# Patient Record
Sex: MALE | Race: WHITE | Employment: OTHER | URBAN - METROPOLITAN AREA
[De-identification: names, ages, dates, MRNs, and addresses within clinical notes are randomized per-mention and may not be internally consistent; named-entity substitution may affect disease eponyms.]

---

## 2018-12-30 ENCOUNTER — HOSPITAL ENCOUNTER (EMERGENCY)
Facility: CLINIC | Age: 33
Discharge: HOME | End: 2018-12-30
Attending: EMERGENCY MEDICINE | Admitting: EMERGENCY MEDICINE

## 2018-12-30 VITALS
RESPIRATION RATE: 24 BRPM | WEIGHT: 250 LBS | HEART RATE: 79 BPM | BODY MASS INDEX: 32.08 KG/M2 | DIASTOLIC BLOOD PRESSURE: 87 MMHG | SYSTOLIC BLOOD PRESSURE: 135 MMHG | TEMPERATURE: 98.7 F | OXYGEN SATURATION: 99 % | HEIGHT: 74 IN

## 2018-12-30 DIAGNOSIS — J40 BRONCHITIS: ICD-10-CM

## 2018-12-30 DIAGNOSIS — F17.200 TOBACCO DEPENDENCE SYNDROME: ICD-10-CM

## 2018-12-30 PROCEDURE — 99284 EMERGENCY DEPT VISIT MOD MDM: CPT | Mod: 25

## 2018-12-30 PROCEDURE — 94640 AIRWAY INHALATION TREATMENT: CPT

## 2018-12-30 PROCEDURE — 25000125 ZZHC RX 250: Performed by: EMERGENCY MEDICINE

## 2018-12-30 RX ORDER — PREDNISONE 20 MG/1
40 TABLET ORAL ONCE
Status: COMPLETED | OUTPATIENT
Start: 2018-12-30 | End: 2018-12-30

## 2018-12-30 RX ORDER — IPRATROPIUM BROMIDE AND ALBUTEROL SULFATE 2.5; .5 MG/3ML; MG/3ML
3 SOLUTION RESPIRATORY (INHALATION) ONCE
Status: COMPLETED | OUTPATIENT
Start: 2018-12-30 | End: 2018-12-30

## 2018-12-30 RX ORDER — PREDNISONE 20 MG/1
TABLET ORAL
Qty: 10 TABLET | Refills: 0 | Status: SHIPPED | OUTPATIENT
Start: 2018-12-30 | End: 2019-01-06

## 2018-12-30 RX ORDER — ALBUTEROL SULFATE 0.83 MG/ML
2.5 SOLUTION RESPIRATORY (INHALATION) ONCE
Status: COMPLETED | OUTPATIENT
Start: 2018-12-30 | End: 2018-12-30

## 2018-12-30 RX ORDER — ALBUTEROL SULFATE 90 UG/1
2 AEROSOL, METERED RESPIRATORY (INHALATION) EVERY 6 HOURS PRN
Qty: 1 INHALER | Refills: 0 | Status: SHIPPED | OUTPATIENT
Start: 2018-12-30 | End: 2019-01-29

## 2018-12-30 RX ADMIN — IPRATROPIUM BROMIDE AND ALBUTEROL SULFATE 3 ML: .5; 3 SOLUTION RESPIRATORY (INHALATION) at 15:21

## 2018-12-30 RX ADMIN — ALBUTEROL SULFATE 2.5 MG: 2.5 SOLUTION RESPIRATORY (INHALATION) at 16:05

## 2018-12-30 RX ADMIN — PREDNISONE 40 MG: 20 TABLET ORAL at 15:21

## 2018-12-30 ASSESSMENT — ENCOUNTER SYMPTOMS
SHORTNESS OF BREATH: 1
LIGHT-HEADEDNESS: 1
VOMITING: 0
COUGH: 1

## 2018-12-30 ASSESSMENT — MIFFLIN-ST. JEOR: SCORE: 2148.74

## 2018-12-30 NOTE — ED AVS SNAPSHOT
[facility] Emergency Department  [address]  [facility] [address]  Phone:  [phone]  Fax:  [phone]                                    Austin Hull   MRN: [id number]    Department:  [facility] Emergency Department   Date of Visit:  12/30/2018           After Visit Summary Signature Page    I have received my discharge instructions, and my questions have been answered. I have discussed any challenges I see with this plan with the nurse or doctor.    ..........................................................................................................................................  Patient/Patient Representative Signature      ..........................................................................................................................................  Patient Representative Print Name and Relationship to Patient    ..................................................               ................................................  Date                                   Time    ..........................................................................................................................................  Reviewed by Signature/Title    ...................................................              ..............................................  Date                                               Time          22EPIC Rev 08/18

## 2018-12-30 NOTE — ED TRIAGE NOTES
Pt has cough and shortness of breath for past 4 days.  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  He has pain in right lower back area.

## 2018-12-30 NOTE — ED PROVIDER NOTES
"  History     Chief Complaint:  Shortness of Breath    The history is provided by the patient.      Austin Hull is a 33 year old male who presents to the emergency department today for evaluation of gradually worsening shortness of breath. Patient states he has been experiencing gradually worsening shortness of breath over the last two months after juanito a cold. He highlight dizziness secondary to coughing spells and reports waking up hourly secondary to coughing spells as his shortness of breath is usually worse at night when it is colder. He denies any emesis, personal history of asthma, previous surgical history, or history of hypertension, hyperlipidemia, or diabetes. He highlights his last use of prednisone was a few years ago and state she has a nebulizer at home of which he has been using at home during exacerbation of his shortness of breath. He notes a family history of asthma.    Allergies:  Penicillins    Medications:    No current medications     Past Medical History:    Bronchitis     Past Surgical History:    History reviewed. No pertinent surgical history.     Family History:    History reviewed. No pertinent family history.     Social History:  Smoking Status: Current Every Day Smoker (1.00 PPD)  Alcohol Use: Positive    Review of Systems   Respiratory: Positive for cough and shortness of breath.    Gastrointestinal: Negative for vomiting.   Neurological: Positive for light-headedness.   All other systems reviewed and are negative.    Physical Exam     Patient Vitals for the past 24 hrs:   BP Temp Temp src Pulse Heart Rate Resp SpO2 Height Weight   12/30/18 1645 -- -- -- -- -- -- 99 % -- --   12/30/18 1640 135/87 -- -- 79 -- -- 94 % -- --   12/30/18 1415 -- -- -- -- -- -- 95 % -- --   12/30/18 1410 -- -- -- 85 -- -- 96 % -- --   12/30/18 1408 (!) 153/93 98.7  F (37.1  C) Oral -- 87 24 95 % 1.88 m (6' 2\") 113.4 kg (250 lb)   12/30/18 1405 (!) 153/93 -- -- -- -- -- -- -- --     Physical " Exam  Constitutional: Vital signs reviewed as above.  Head: No external signs of trauma noted.  Eyes: Pupils are equal, round, and reactive to light.   Neck: No JVD noted  Cardiovascular: Normal rate, regular rhythm and normal heart sounds.  No murmur heard. Equal B/L peripheral pulses.  Pulmonary/Chest: No respiratory distress. Patient has wheezes in all fields. Patient has no rales.   Gastrointestinal: Soft. There is no tenderness.   Musculoskeletal/Extremities: No edema noted. Normal tone.  Neurological: Patient is alert and oriented to person, place, and time.   Skin: Skin is warm and dry. There is no diaphoresis noted.   Psychiatric: The patient appears calm.    Emergency Department Course     Interventions:  1521: Duoneb 3 mL nebulization   1521: Prednisone 40 mg PO  1605: Albuterol 2.5 mg nebulization    Emergency Department Course:    1431 Nursing notes and vitals reviewed.    1454 I performed an exam of the patient as documented above.     1535 Recheck and update.    1555 Recheck and update.    1634 Recheck and update.  I personally answered all related questions prior to discharge.    Impression & Plan      Medical Decision Making:  Austin Hull is a 33 year old male who presents to the emergency department today for evaluation of dyspnea.  Please see the HPI and exam for specifics.  Patient improved in the ED after nebulization treatments.  He did have notable wheezing that improved after these treatments.  The patient is a 1 pack/day smoker and is aware that he does need to quit.  We discussed reasonable ways to reduce cigarette smoking and the patient seemed amenable to this.  I will send him home with steroids and an inhaler as he has been using his girlfriend's inhaler.  The patient does note that he does have an albuterol nebulizer with medication at home and I have suggested that he increase his frequency of usage with that and decrease cigarette smoking.  Anticipatory guidance given prior to  discharge.    Diagnosis:    ICD-10-CM   1. Bronchitis J40   2. Tobacco dependence syndrome F17.200     Disposition:   The patient is discharged to home.    Discharge Medications:     Review of your medicines      UNREVIEWED medicines. Ask your doctor about these medicines      Dose / Directions   benzonatate 200 MG capsule  Commonly known as:  TESSALON      Dose:  200 mg  Take 1 capsule (200 mg) by mouth 3 times daily as needed for cough  Quantity:  21 capsule  Refills:  0        START taking      Dose / Directions   albuterol 108 (90 Base) MCG/ACT inhaler  Commonly known as:  PROAIR HFA/PROVENTIL HFA/VENTOLIN HFA      Dose:  2 puff  Inhale 2 puffs into the lungs every 6 hours as needed for shortness of breath / dyspnea or wheezing  Quantity:  1 Inhaler  Refills:  0        CONTINUE these medicines which have NOT CHANGED      Dose / Directions   predniSONE 20 MG tablet  Commonly known as:  DELTASONE      Take two tablets (= 40mg) each day for 5 (five) days.  Quantity:  10 tablet  Refills:  0           Where to get your medicines      Some of these will need a paper prescription and others can be bought over the counter. Ask your nurse if you have questions.    Bring a paper prescription for each of these medications    albuterol 108 (90 Base) MCG/ACT inhaler    predniSONE 20 MG tablet       Scribe Disclosure:  Arcenio REDDING, am serving as a scribe at 2:54 PM on 12/30/2018 to document services personally performed by Bang Mckinley DO based on my observations and the provider's statements to me.    [facility] EMERGENCY DEPARTMENT       Bang Mckinley DO  12/30/18 1936

## 2021-01-30 ENCOUNTER — HOSPITAL ENCOUNTER (EMERGENCY)
Facility: CLINIC | Age: 36
Discharge: HOME | End: 2021-01-30
Attending: EMERGENCY MEDICINE | Admitting: EMERGENCY MEDICINE

## 2021-01-30 ENCOUNTER — APPOINTMENT (OUTPATIENT)
Dept: GENERAL RADIOLOGY | Facility: CLINIC | Age: 36
End: 2021-01-30
Attending: EMERGENCY MEDICINE

## 2021-01-30 VITALS
TEMPERATURE: 98.2 F | RESPIRATION RATE: 16 BRPM | SYSTOLIC BLOOD PRESSURE: 140 MMHG | DIASTOLIC BLOOD PRESSURE: 102 MMHG | OXYGEN SATURATION: 100 % | HEART RATE: 82 BPM

## 2021-01-30 DIAGNOSIS — R51.9 FACIAL PAIN: ICD-10-CM

## 2021-01-30 PROCEDURE — 99283 EMERGENCY DEPT VISIT LOW MDM: CPT

## 2021-01-30 PROCEDURE — 70220 X-RAY EXAM OF SINUSES: CPT

## 2021-01-30 PROCEDURE — 250N000013 HC RX MED GY IP 250 OP 250 PS 637: Performed by: EMERGENCY MEDICINE

## 2021-01-30 RX ORDER — ACETAMINOPHEN 500 MG
1000 TABLET ORAL ONCE
Status: COMPLETED | OUTPATIENT
Start: 2021-01-30 | End: 2021-01-30

## 2021-01-30 RX ORDER — CLINDAMYCIN HCL 300 MG
300 CAPSULE ORAL 4 TIMES DAILY
Qty: 28 CAPSULE | Refills: 0 | Status: SHIPPED | OUTPATIENT
Start: 2021-01-30 | End: 2021-02-06

## 2021-01-30 RX ORDER — OXYCODONE HYDROCHLORIDE 5 MG/1
5 TABLET ORAL ONCE
Status: COMPLETED | OUTPATIENT
Start: 2021-01-30 | End: 2021-01-30

## 2021-01-30 RX ORDER — IBUPROFEN 200 MG
600 TABLET ORAL EVERY 8 HOURS PRN
Qty: 1 TABLET | Refills: 0 | Status: SHIPPED | OUTPATIENT
Start: 2021-01-30

## 2021-01-30 RX ORDER — ACETAMINOPHEN 500 MG
500-1000 TABLET ORAL EVERY 8 HOURS PRN
Qty: 1 TABLET | Refills: 0 | Status: SHIPPED | OUTPATIENT
Start: 2021-01-30 | End: 2021-02-09

## 2021-01-30 RX ADMIN — ACETAMINOPHEN 1000 MG: 500 TABLET, FILM COATED ORAL at 17:23

## 2021-01-30 RX ADMIN — OXYCODONE HYDROCHLORIDE 5 MG: 5 TABLET ORAL at 17:23

## 2021-01-30 ASSESSMENT — ENCOUNTER SYMPTOMS
COLOR CHANGE: 1
SHORTNESS OF BREATH: 0
FEVER: 0
FACIAL SWELLING: 1
VOMITING: 0
RHINORRHEA: 1
COUGH: 0

## 2021-01-30 NOTE — ED PROVIDER NOTES
History   Chief Complaint:  Facial Pain       The history is provided by the patient.      Austin Hull is a 35 year old male with history of substance abuse who presents for evaluation of left-sided facial pain, swelling, and redness for the past two days. The pain began two days ago and improved yesterday but became very painful again today. He reports relief when pushing on his sinuses. Pain is not worse with cold air. He notes rhinorrhea and congestion for four days prior to onset of facial pain. He has a history of dental abscess but notes this is more painful. He denies fever, cough, shortness of breath, vomiting, rash, loss of taste/smell, or other symptoms. He took 600 mg ibuprofen one hour ago.     Review of Systems   Constitutional: Negative for fever.   HENT: Positive for congestion, dental problem, facial swelling and rhinorrhea.    Respiratory: Negative for cough and shortness of breath.    Gastrointestinal: Negative for vomiting.   Skin: Positive for color change. Negative for rash.   All other systems reviewed and are negative.      Allergies:  Penicillins    Medications:  The patient is currently on no regular medications.    Past Medical History:    Asthma  Bronchitis  Methamphetamine abuse    Social History:  Presents with his partner  PCP: No primary clinic  Tobacco use: Positive  Drug use: History of substance abuse    Physical Exam     Patient Vitals for the past 24 hrs:   BP Temp Temp src Pulse Resp SpO2   01/30/21 1659 (!) 140/102 98.2  F (36.8  C) Oral 82 16 100 %       Physical Exam    HENT: Possible subtle swelling to left malar region. No appreciable overlying erythema.    Mouth/Throat: Oropharynx is without swelling or erythema. Oral mucosa moist. Poor dentition overall. Few teeth remain to upper left side. No tenderness to percussion of remaining teeth. No adjacent swelling/fluctuance.    Eyes: Conjunctivae are normal. No scleral icterus.  Neck: Neck supple. No cervical  adenopathy  Cardiovascular: Normal rate, regular rhythm and intact distal pulses.    Pulmonary/Chest: Effort normal and breath sounds normal.   Neurological:Alert. Coordination normal. No facial asymmetry with movement.   Skin: Skin is warm and dry.   Psychiatric: Normal mood and affect.      Emergency Department Course     Imaging:  XR Sinus Complete G/E 3 views:  Paranasal sinuses are clear. No fracture. No fluid in the sinuses, as per radiology.      Emergency Department Course:    Reviewed:  I reviewed nursing notes, vitals, past medical history and care everywhere    Assessments:  1705 I obtained history and examined the patient as noted above.   1808 I rechecked the patient and explained findings.     Interventions:  1723 Oxycodone, 5 mg, PO  1723 Acetaminophen, 1,000 mg, PO    Disposition:  The patient was discharged to home.       Impression & Plan       Medical Decision Making:  Austin Hull is a 35 year old male with dental pain, left sided facial swelling. The differential diagnosis included but was not limited to odontogenic infection, sinusitis, cellulitis. ED  Evaluation as noted above.  There are no findings concerning for sinusitis on x-ray and his sinus symptoms were subtle.  For this reason I believe the pain is likely odontogenic and will initiate antibiotics for dental source.  Recommended close follow-up with a dentist as soon as possible and returning to the emergency department with new or worsening symptoms.        Diagnosis:    ICD-10-CM    1. Facial pain  R51.9     likely odontogenic       Discharge Medications:  New Prescriptions    ACETAMINOPHEN (TYLENOL) 500 MG TABLET    Take 1-2 tablets (500-1,000 mg) by mouth every 8 hours as needed for mild pain (DO NOT FILL! For dosing only.) DO NOT FILL!   For Dosing Only    CLINDAMYCIN (CLEOCIN) 300 MG CAPSULE    Take 1 capsule (300 mg) by mouth 4 times daily for 7 days    IBUPROFEN (ADVIL/MOTRIN) 200 MG TABLET    Take 3 tablets (600 mg) by  mouth every 8 hours as needed for mild pain       Scribe Disclosure:  I, Stella Deloresparkstella, am serving as a scribe at 5:00 PM on 1/30/2021 to document services personally performed by Rosanna Diez MD based on my observations and the provider's statements to me.              Rosanna Diez MD  01/30/21 2012

## 2021-01-30 NOTE — ED TRIAGE NOTES
Pt here for R sided face pain, states that it feels like sinus pain. Pain is better when he presses on cheek. Pain started last week, but is worsening. Motrin taken this morning with minor relief. A+Ox4, no acute signs of distress.

## 2021-01-31 NOTE — DISCHARGE INSTRUCTIONS
Discharge Instructions  Dental Pain    You have been seen today for a toothache. Your pain may be caused by an exposed nerve, an infection (pulpitis), a root abscess, or other problems. You will need to see a dentist for a solution to your tooth problem. Emergency Department care is only to help control your problem until you can see a dentist.  Today, we did not find any sign that your toothache was caused by a serious condition, but sometimes symptoms develop over time and cannot be found during an emergency visit, so it is very important that you follow up with your dentist.      Return to the Emergency Department if:  You develop a fever over 101 degrees Fahrenheit  You can t open your mouth normally, can t move your tongue well, or can t swallow  You have new or increased swelling of your face or neck.  You develop drainage of pus or foul smelling material from around your tooth.  What can I do to help myself?  Take any antibiotic the doctor may have prescribed for you today.  Avoid very hot or very cold foods as both can cause pain.  Make an appointment to see a dentist as soon as possible. If you wish, we can provide you with a list of low-cost dental clinics.       Remember that you can always come back to the Emergency Department if you are not able to see your regular doctor in the amount of time listed above, if you get any new symptoms, or if there is anything that worries you.        Dental Resources  Name/Address/Phone Eligibility Hours Fee   [facility]  [address], [address]  [place] [address]  [phone] Anyone Call for appointment [facility]  Medical [facility]  Private Insurance   [facility]  Hygiene Clinic  [facility] [place], [place] [id number] [phone] Anyone Call for appointment    [facility] refers to low-cost dental clinics for non-preventive care    Czech Interpreters available Prices start at   Adults        Cleaning $36-$160        X-Ray  $20-40  Children        Cleaning $15        X-ray $10-20        Fluoride $10  Accepts cash, check or credit;  Does not take insurance or MA.   [facility]  [address] [place], [place]  [id number] [phone] Anyone Afternoons and evenings    September-May    Answers phones after 10 AM $30.00 per visit   ($15.00 per visit if 62 or older)   Preventive care.  Restoration care; sliding fee; MA   [facility]  [address] [place], [place] [id number] [phone] Children birth to age 18 and pregnant women    [facility] Residents without insurance will be asked to apply for Assured Care. M TH F 8:30 am - 5 pm  T W 8:30 am - 7 pm    30 locations metro wide    Call for appointment and to confirm hours. Sliding Fee  [facility]  Medical Assistance  Assured Access  Private Insurance    8 Languages Spoken   [facility] [address] [address]  [phone] Anyone Call for appointment Sliding Fee  Accept insurance, [place],   [place] and self-pay.  Call if no insurance.    All services provided.  Staff fluent in Hmong, Laotian, Jori, Hungarian, Slovak, Kazakh, and Farsi.   [facility]  [address] [place] [address]  [phone] Children  Adults in a walk in basis Mon - Fri. 8 - 5 pm       (closed 1-2 pm)  General Dentists & Hygienists  Private Dentists  Dentures Fees based on family size and income ranging from 40% - 100% payment by patient.   [facility]  [address] [place], [place]  [id number] [phone] Anyone Mon - Fri 7:30 am - 5:00 pm  By Appt.    Tues & Wed @ 3:00 call for urgent care Appt for next day service Sliding fee:  MA; Insurance   [facility]  [address] [place], [place]  [id number] [phone] Anyone Call for an appointment.  Days open vary every semester. Adult cleaning $25  Child cleaning $15  X-Rays $10-$15  Whitening  services available  $75, includes cleaning  Seniors 50% off   [facility]  [address] [place]  [address]  [phone] Anyone M-F 8 am - 5 pm Most insurances accepted.  MA and Sliding Scale.   [facility]  [facility] [place], [place]  [id number] [phone] Anyone without insurance Call to make appt   M-F 9:00 am - 5 pm   (Closed noon hour 12-1)    6 pm- 8 pm Evening hours also available for care Sliding fee based on income and size of household.   [facility] Clinic  [address] [place]  [address]  [phone] press option 1    For the [facility] press option 4 Anyone              Anyone Monday  4 - 6:30 pm  Tuesday 12:30 - 3 & 4-6:30  Thursday 8:30 - 11 am & 12-2:30 pm  September through May only    All year around on Thursdays from 5-9 pm (only time a dentist is in.) Cleanings & X-Rays Only  Cleanings:  Adults $30                   Kids $20  X-Rays:  Adults $34                Kids $10    MA and Sliding fee   [address] [id number] [phone] Anyone    (Crowdwaveong interpreters available) M-F 8:00 am - 5:00 pm       By appointment only  (same day appointments available) Sliding fee ($40+ may be due at appointment, remainder billed); MA; Insurance   [facility]  [address] [place], [place] [phone]   Anyone    (Hmong interpreters available) M-Th 8:00 am - 8:00 pm  F 8:00 am - 5:00 pm    By appointment only  (same day appointments available) Sliding fee ($40+ may be due at appointment, remainder billed); MA; Insurance   [facility]  [address] [place], [place]  [address] [phone] Anyone    Must live within [facility] to qualify for sliding fee services Mon, Tues, Thurs, Fri  8:30 am - 5:00 pm  Wed. 8:30 am - 7:00 pm  All other services by appt. only Sliding  Fee; MA   Offer payment plans    Have financial workers that will assist with [place] and will use sliding fee for those who do not qualify.      [facility]  [address] [place] [address]  [phone] Anyone without insurance     Hours and day of week vary  (Call ahead for hours)    Walk-in only Free Services    Cleanings; Fillings; Extractions   [facility]  [address] [place] [address]  [address]  [phone] Anyone Call for an appointment Accept patients with [place] and Medical Assistance.  10% discount if bill is paid in full on day of service.  No sliding fee scale.     [facility]  [address] [place] [address]  [phone] Anyone M-F  8 am-5 pm  Call for appt.    Walk-in hours 8 am - 11am and 1 pm - 5 pm, however take scheduled appointments first    No emergency services or oral surgeries Sliding Fee available with an [place] or [place] denial letter and proof of income.    Accepts Assured Access card and MA coverage.     Name/Address/Phone Eligibility Hours Fee   [facility]  [address] [place]  [address]  [phone] Anyone with emergencies only M & W clinic begins at 6 pm   Call ahead    Alternate Fridays for children by Appt only Free   [facility]  [address] [place], [place] [address] [phone]    Emergencies (adults only):  [phone] Anyone Free walk-in screens for oral surgery    Call ahead for hours    All other services by appt. only  Accepts MA for pediatrics only    Rates are about 25% - 40% less than private dental office.    No sliding fee scale   [facility]  [address] [place]  [address]  [phone] Anyone as long as they do not have health insurance Hours on Mondays, Tuesdays, and Thursdays Sliding fees based on monthly income    No root canals, tooth pulls or emergencies   [facility]  [address]  [facility] [id number] [phone] Anyone  M - F 8:00 am - 5:00 pm  Wed 8:00 am - 8 pm Sliding fees; MA; Insurance   [facility]  [address]  [place], [place]  [phone] Anyone age 55+ M - F 8:00 am - 4:30 pm    Appt. only Set slightly lower fees;  MA; Insurance         Give Kids a smile day in [facility] Children Takes place in February at a few locations                          Dental Pain:      Dear Emergency Department Patient:     Here at [facility], we are always pleased to evaluate you for emergency conditions and offer a screening examination. Today, we have seen you and determined that you have dental pain and/or a dental problem.  We do not have the equipment and/or advanced training to perform definitive dental care, therefore, you need to be seen by a dentist for further care.     You may see your regular dent  ist if you have one, or we have attached a list of community dental providers, including some who provide care at a reduced fee.      Please be aware that if a narcotic medication was prescribed, it will not be refilled in the emergency department.  Accordingly, if you should have ongoing problems and/or pain, you should contact a dentist right away for definitive treatment.    Sincerely,        [facility] ([facility])